# Patient Record
(demographics unavailable — no encounter records)

---

## 2025-06-23 NOTE — HISTORY OF PRESENT ILLNESS
[de-identified] : Ms. MICHAEL JIANG is a 77-year-old woman, referred by Dr. Zakiya Conte for newly dx i breast cancer, here for an initial visit. Reports skin changes to the left breast that started ~5 months prior - she did not have insurance at that time so unable to have work-up. Her last breast imaging was >10 years ago.  PMH/PSH: NIDDM, hx of parathyroid sx Denies FamHX of malignancy ECOG 0, denies tobacco or ETOH use   Left medial chest 5/28/2025 (done after peau d' orange changes) - invasive adenocarcinoma c/w invasive breast adeno, positive for ER/IA (no formal receptors)

## 2025-06-23 NOTE — ASSESSMENT
[FreeTextEntry1] : IMP: 78yo F w/ newly dx inflammatory breast cancer  Left medial chest 5/28/2025 (done after peau d' orange changes) - invasive adenocarcinoma c/w invasive breast cancer  PLAN:  left mastectomy & SLNB ( the patient cannot have chemotherapy with the present skin changes ) RIGHT mammo/sono now Bone Scan CT C/A/P to r/o distant mets  Adjuvant radiation and probable chemotherapy

## 2025-06-23 NOTE — PHYSICAL EXAM
[Normal] : supple, no neck mass and thyroid not enlarged [Normal Supraclavicular Lymph Nodes] : normal supraclavicular lymph nodes [Normal Axillary Lymph Nodes] : normal axillary lymph nodes [Normal] : oriented to person, place and time, with appropriate affect [de-identified] : left breast mass replacing the entire breast with skin nodularity; right breast negative

## 2025-06-23 NOTE — CONSULT LETTER
[Dear  ___] : Dear  [unfilled], [Consult Letter:] : I had the pleasure of evaluating your patient, [unfilled]. [Please see my note below.] : Please see my note below. [Consult Closing:] : Thank you very much for allowing me to participate in the care of this patient.  If you have any questions, please do not hesitate to contact me. [Sincerely,] : Sincerely, [DrMaria M  ___] : Dr. LOUIS [FreeTextEntry2] : Zakiya Conte, DO [FreeTextEntry1] : I will keep you informed of the final pathology. [FreeTextEntry3] : Son Sheppard MD FACS Chief of Surgical Oncology

## 2025-07-24 NOTE — ASSESSMENT
[FreeTextEntry1] : IMP: 78yo F w/ newly dx Stage III  breast cancer Left medial chest 5/28/2025 (done after peau d' orange changes) - invasive adenocarcinoma c/w invasive breast cancer  CT C/A/P 7/2025 - dominant inferior LEFT breast mass, additional multiple smaller nodular densities which extend more superiorly throughout the breast tissue. pronounced overlying left breast skin thickening & ? ulceration at the left nipple - c/w known cancer - few cutaneous foci are seen extending into the lateral upper LEFT abd - indeterminate, potentially cutaneous mets - rounded LEFT axillary 1.3 cm node, c/f mets   NM Bone Scan 7/2025 - no evidence of osteoblastic mets   now s/p LEFT mastectomy (w/ PRS closure by Dr. Brumfield) on 7/10/2025, path:  15 cm, Invasive poorly differentiated ductal carcinoma. Extends to the posterior margin. PNI identified. 21/23 LN postive for metastatic carcinoma measuring up to 13 mm. Extranodal extension and perineural invasion are identified. LN dissection: 6/7 LN metastatic LN, 6 mm  PLAN: Medical oncology and radiation oncology evaluation Return to Dr. Brumfield  RTO 3 months

## 2025-07-24 NOTE — HISTORY OF PRESENT ILLNESS
[de-identified] : Ms. MICHAEL JIANG is a 77-year-old woman, referred by Dr. Zakiya Conte for newly dx breast cancer, here for a post-op visit. Reports skin changes to the left breast that started ~5 months prior - she did not have insurance at that time so unable to have work-up. Her last breast imaging was >10 years ago.  PMH/PSH: NIDDM, hx of parathyroid sx Denies FamHX of malignancy ECOG 0, denies tobacco or ETOH use   Left medial chest 5/28/2025 (done after peau d' orange changes) - invasive adenocarcinoma c/w invasive breast adeno, positive for ER/MO (no formal receptors)  RIGHT mammo/sono 6/2025 - BIRADS 2  CT C/A/P 7/2025 - dominant inferior LEFT breast mass, additional multiple smaller nodular densities which extend more superiorly throughout the breast tissue. pronounced overlying left breast skin thickening & ? ulceration at the left nipple - c/w known cancer - few cutaneous foci are seen extending into the lateral upper LEFT abd - indeterminate, potentially cutaneous mets - rounded LEFT axillary 1.3 cm node, c/f mets   NM Bone Scan 7/2025 - no evidence of osteoblastic mets   now s/p LEFT mastectomy (w/ PRS closure by Dr. Brumfield) on 7/10/2025, path:  15 cm, Invasive poorly differentiated ductal carcinoma. Extends to the posterior margin. PNI identified. 27/29  LN 's postive for metastatic carcinoma measuring up to 13 mm. Extranodal extension and perineural invasion are identified.

## 2025-07-24 NOTE — PHYSICAL EXAM
[Normal] : supple, no neck mass and thyroid not enlarged [Normal Supraclavicular Lymph Nodes] : normal supraclavicular lymph nodes [Normal Axillary Lymph Nodes] : normal axillary lymph nodes [Normal] : oriented to person, place and time, with appropriate affect [de-identified] : left chest wall flap healing nicely. drain in place

## 2025-07-24 NOTE — ASSESSMENT
[FreeTextEntry1] : IMP: 76yo F w/ newly dx Stage III  breast cancer Left medial chest 5/28/2025 (done after peau d' orange changes) - invasive adenocarcinoma c/w invasive breast cancer  CT C/A/P 7/2025 - dominant inferior LEFT breast mass, additional multiple smaller nodular densities which extend more superiorly throughout the breast tissue. pronounced overlying left breast skin thickening & ? ulceration at the left nipple - c/w known cancer - few cutaneous foci are seen extending into the lateral upper LEFT abd - indeterminate, potentially cutaneous mets - rounded LEFT axillary 1.3 cm node, c/f mets   NM Bone Scan 7/2025 - no evidence of osteoblastic mets   now s/p LEFT mastectomy (w/ PRS closure by Dr. Brumfield) on 7/10/2025, path:  15 cm, Invasive poorly differentiated ductal carcinoma. Extends to the posterior margin. PNI identified. 21/23 LN postive for metastatic carcinoma measuring up to 13 mm. Extranodal extension and perineural invasion are identified. LN dissection: 6/7 LN metastatic LN, 6 mm  PLAN: Medical oncology and radiation oncology evaluation Return to Dr. Brumfield  RTO 3 months

## 2025-07-24 NOTE — HISTORY OF PRESENT ILLNESS
[de-identified] : Ms. MICHAEL JIANG is a 77-year-old woman, referred by Dr. Zakiya Conte for newly dx breast cancer, here for a post-op visit. Reports skin changes to the left breast that started ~5 months prior - she did not have insurance at that time so unable to have work-up. Her last breast imaging was >10 years ago.  PMH/PSH: NIDDM, hx of parathyroid sx Denies FamHX of malignancy ECOG 0, denies tobacco or ETOH use   Left medial chest 5/28/2025 (done after peau d' orange changes) - invasive adenocarcinoma c/w invasive breast adeno, positive for ER/MO (no formal receptors)  RIGHT mammo/sono 6/2025 - BIRADS 2  CT C/A/P 7/2025 - dominant inferior LEFT breast mass, additional multiple smaller nodular densities which extend more superiorly throughout the breast tissue. pronounced overlying left breast skin thickening & ? ulceration at the left nipple - c/w known cancer - few cutaneous foci are seen extending into the lateral upper LEFT abd - indeterminate, potentially cutaneous mets - rounded LEFT axillary 1.3 cm node, c/f mets   NM Bone Scan 7/2025 - no evidence of osteoblastic mets   now s/p LEFT mastectomy (w/ PRS closure by Dr. Brumfield) on 7/10/2025, path:  15 cm, Invasive poorly differentiated ductal carcinoma. Extends to the posterior margin. PNI identified. 27/29  LN 's postive for metastatic carcinoma measuring up to 13 mm. Extranodal extension and perineural invasion are identified.

## 2025-07-24 NOTE — PHYSICAL EXAM
[Normal] : supple, no neck mass and thyroid not enlarged [Normal Supraclavicular Lymph Nodes] : normal supraclavicular lymph nodes [Normal Axillary Lymph Nodes] : normal axillary lymph nodes [Normal] : oriented to person, place and time, with appropriate affect [de-identified] : left chest wall flap healing nicely. drain in place

## 2025-07-24 NOTE — CONSULT LETTER
[Dear  ___] : Dear  [unfilled], [Consult Letter:] : I had the pleasure of evaluating your patient, [unfilled]. [Please see my note below.] : Please see my note below. [Consult Closing:] : Thank you very much for allowing me to participate in the care of this patient.  If you have any questions, please do not hesitate to contact me. [Sincerely,] : Sincerely, [DrMaria M  ___] : Dr. LOUIS [Courtesy Letter:] : I had the pleasure of seeing your patient, [unfilled], in my office today. [FreeTextEntry2] : Zakiya Conte, DO [FreeTextEntry1] : she will be getting adjuvant chemotherapy. [FreeTextEntry3] : Son Sheppard MD FACS Chief of Surgical Oncology

## 2025-07-29 NOTE — PHYSICAL EXAM
[de-identified] : Well-healed left breast incision.  No evidence of seroma or hematoma.  FRANCOISE drain with serous output.  Today putting out too much drainage to remove

## 2025-07-29 NOTE — PHYSICAL EXAM
[de-identified] : Well-healed left breast incision.  No evidence of seroma or hematoma.  FRANCOISE drain with serous output.  Today putting out too much drainage to remove

## 2025-07-29 NOTE — HISTORY OF PRESENT ILLNESS
[FreeTextEntry1] : The patient is a 77-year-old female who presents today for 1st post Opp visit, s/p: Lt breast recon w-plastic closure, dos: 07/10/2025. Patient reports she has one drain in placed; she reports discomfort from drain site. Patient denies having fever or chills.  Patient doing well.  No concerns

## 2025-07-29 NOTE — ASSESSMENT
[FreeTextEntry1] : Patient doing well postop from removal of a fungating breast cancer and closure with local tissue rearrangement.  Will have patient continue with compression and will have her return next week for drain removal.  Okay to shower